# Patient Record
Sex: FEMALE | Race: BLACK OR AFRICAN AMERICAN | URBAN - METROPOLITAN AREA
[De-identification: names, ages, dates, MRNs, and addresses within clinical notes are randomized per-mention and may not be internally consistent; named-entity substitution may affect disease eponyms.]

---

## 2022-04-19 ENCOUNTER — OFFICE VISIT (OUTPATIENT)
Dept: URBAN - METROPOLITAN AREA CLINIC 27 | Facility: CLINIC | Age: 62
End: 2022-04-19

## 2022-04-25 ENCOUNTER — WEB ENCOUNTER (OUTPATIENT)
Dept: URBAN - METROPOLITAN AREA CLINIC 84 | Facility: CLINIC | Age: 62
End: 2022-04-25

## 2022-04-25 ENCOUNTER — OFFICE VISIT (OUTPATIENT)
Dept: URBAN - METROPOLITAN AREA CLINIC 84 | Facility: CLINIC | Age: 62
End: 2022-04-25
Payer: COMMERCIAL

## 2022-04-25 DIAGNOSIS — K62.5 RECTAL BLEEDING: ICD-10-CM

## 2022-04-25 PROCEDURE — 99204 OFFICE O/P NEW MOD 45 MIN: CPT | Performed by: INTERNAL MEDICINE

## 2022-04-25 RX ORDER — LUBIPROSTONE 24 UG/1
1 CAPSULE WITH FOOD AND WATER CAPSULE, GELATIN COATED ORAL TWICE A DAY
Qty: 60 CAPSULE | Refills: 4 | OUTPATIENT
Start: 2022-04-25 | End: 2022-09-22

## 2022-04-25 NOTE — PHYSICAL EXAM GASTROINTESTINAL
Self Exam: Abdomen soft, non-tender to palpatation, non-distended , Abdomen , soft, nontender, nondistended , no guarding or rigidity , no masses palpable , normal bowel sounds , Liver and Spleen , no hepatomegaly present , no hepatosplenomegaly , liver nontender , spleen not palpable , Rectal , normal sphincter tone , no internal hemorrhoids, rectal masses or bleeding present

## 2022-04-25 NOTE — HPI-TODAY'S VISIT:
63 yo pt presents with c/o painless rectal bleeding for 4 days last week during constipation episodes with straining. Patient had a normal colonoscopy 2019. She denies prior episodes in the past.

## 2022-04-30 ENCOUNTER — TELEPHONE ENCOUNTER (OUTPATIENT)
Dept: URBAN - METROPOLITAN AREA CLINIC 121 | Facility: CLINIC | Age: 62
End: 2022-04-30

## 2022-05-01 ENCOUNTER — TELEPHONE ENCOUNTER (OUTPATIENT)
Dept: URBAN - METROPOLITAN AREA CLINIC 121 | Facility: CLINIC | Age: 62
End: 2022-05-01

## 2022-05-01 RX ORDER — LEVOTHYROXINE SODIUM 88 MCG
TABLET ORAL
Status: ACTIVE | COMMUNITY
Start: 2015-06-04

## 2022-05-01 RX ORDER — AMLODIPINE AND BENAZEPRIL HYDROCHLORIDE 5; 20 MG/1; MG/1
CAPSULE ORAL
Status: ACTIVE | COMMUNITY
Start: 2015-06-04

## 2022-05-01 RX ORDER — ASPIRIN 81 MG/1
TABLET ORAL
Status: ACTIVE | COMMUNITY
Start: 2015-06-04

## 2022-05-01 RX ORDER — COLD-HOT PACK
EACH MISCELLANEOUS
Status: ACTIVE | COMMUNITY
Start: 2015-06-04

## 2022-05-01 RX ORDER — ATORVASTATIN CALCIUM 20 MG/1
TABLET, FILM COATED ORAL
Status: ACTIVE | COMMUNITY
Start: 2015-06-04

## 2022-06-06 ENCOUNTER — OFFICE VISIT (OUTPATIENT)
Dept: URBAN - METROPOLITAN AREA CLINIC 84 | Facility: CLINIC | Age: 62
End: 2022-06-06

## 2022-06-14 ENCOUNTER — OFFICE VISIT (OUTPATIENT)
Dept: URBAN - METROPOLITAN AREA CLINIC 84 | Facility: CLINIC | Age: 62
End: 2022-06-14

## 2022-06-14 RX ORDER — LUBIPROSTONE 24 UG/1
1 CAPSULE WITH FOOD AND WATER CAPSULE, GELATIN COATED ORAL TWICE A DAY
Qty: 60 CAPSULE | Refills: 4 | Status: ACTIVE | COMMUNITY
Start: 2022-04-25 | End: 2022-09-22

## 2022-06-14 RX ORDER — LEVOTHYROXINE SODIUM 88 MCG
TABLET ORAL
Status: ACTIVE | COMMUNITY
Start: 2015-06-04

## 2022-06-14 RX ORDER — ATORVASTATIN CALCIUM 20 MG/1
TABLET, FILM COATED ORAL
Status: ACTIVE | COMMUNITY
Start: 2015-06-04

## 2022-06-14 RX ORDER — COLD-HOT PACK
EACH MISCELLANEOUS
Status: ACTIVE | COMMUNITY
Start: 2015-06-04

## 2022-06-14 RX ORDER — ASPIRIN 81 MG/1
TABLET ORAL
Status: ACTIVE | COMMUNITY
Start: 2015-06-04

## 2022-06-14 RX ORDER — AMLODIPINE AND BENAZEPRIL HYDROCHLORIDE 5; 20 MG/1; MG/1
CAPSULE ORAL
Status: ACTIVE | COMMUNITY
Start: 2015-06-04

## 2022-07-29 ENCOUNTER — OFFICE VISIT (OUTPATIENT)
Dept: URBAN - METROPOLITAN AREA CLINIC 84 | Facility: CLINIC | Age: 62
End: 2022-07-29

## 2022-08-29 ENCOUNTER — OFFICE VISIT (OUTPATIENT)
Dept: URBAN - METROPOLITAN AREA CLINIC 84 | Facility: CLINIC | Age: 62
End: 2022-08-29
Payer: COMMERCIAL

## 2022-08-29 VITALS
HEART RATE: 67 BPM | BODY MASS INDEX: 35.62 KG/M2 | SYSTOLIC BLOOD PRESSURE: 112 MMHG | DIASTOLIC BLOOD PRESSURE: 75 MMHG | HEIGHT: 65 IN | WEIGHT: 213.8 LBS | TEMPERATURE: 98.1 F

## 2022-08-29 DIAGNOSIS — K59.00 CONSTIPATION, UNSPECIFIED CONSTIPATION TYPE: ICD-10-CM

## 2022-08-29 DIAGNOSIS — R14.0 BLOATING: ICD-10-CM

## 2022-08-29 DIAGNOSIS — K90.49 FOOD INTOLERANCE: ICD-10-CM

## 2022-08-29 PROBLEM — 14760008: Status: ACTIVE | Noted: 2022-08-29

## 2022-08-29 PROCEDURE — 99204 OFFICE O/P NEW MOD 45 MIN: CPT | Performed by: INTERNAL MEDICINE

## 2022-08-29 RX ORDER — LUBIPROSTONE 8 UG/1
1 CAPSULE WITH FOOD AND WATER CAPSULE, GELATIN COATED ORAL TWICE A DAY
Qty: 60 CAPSULE | Refills: 3 | OUTPATIENT
Start: 2022-08-29 | End: 2022-12-26

## 2022-08-29 RX ORDER — COLD-HOT PACK
EACH MISCELLANEOUS
Status: ACTIVE | COMMUNITY
Start: 2015-06-04

## 2022-08-29 RX ORDER — LEVOTHYROXINE SODIUM 88 MCG
TABLET ORAL
Status: ACTIVE | COMMUNITY
Start: 2015-06-04

## 2022-08-29 RX ORDER — ASPIRIN 81 MG/1
TABLET ORAL
Status: ACTIVE | COMMUNITY
Start: 2015-06-04

## 2022-08-29 RX ORDER — AMLODIPINE AND BENAZEPRIL HYDROCHLORIDE 5; 20 MG/1; MG/1
CAPSULE ORAL
Status: ACTIVE | COMMUNITY
Start: 2015-06-04

## 2022-08-29 RX ORDER — ATORVASTATIN CALCIUM 20 MG/1
TABLET, FILM COATED ORAL
Status: ACTIVE | COMMUNITY
Start: 2015-06-04

## 2022-08-29 RX ORDER — LUBIPROSTONE 24 UG/1
1 CAPSULE WITH FOOD AND WATER CAPSULE, GELATIN COATED ORAL TWICE A DAY
Qty: 60 CAPSULE | Refills: 4 | Status: ACTIVE | COMMUNITY
Start: 2022-04-25 | End: 2022-09-22

## 2022-08-29 NOTE — HPI-TODAY'S VISIT:
61 yo pt presents with c/o abdominal discomfort with burning and belching and worst with meals. Admits to diarrhea and hard stools. Sxs have been present for 4-6 weeks. Sxs her getting worst. Admits no change in weight. Sxs occur mainly during day.

## 2022-10-24 ENCOUNTER — WEB ENCOUNTER (OUTPATIENT)
Dept: URBAN - METROPOLITAN AREA CLINIC 84 | Facility: CLINIC | Age: 62
End: 2022-10-24

## 2022-10-24 ENCOUNTER — DASHBOARD ENCOUNTERS (OUTPATIENT)
Age: 62
End: 2022-10-24

## 2022-10-24 ENCOUNTER — OFFICE VISIT (OUTPATIENT)
Dept: URBAN - METROPOLITAN AREA CLINIC 84 | Facility: CLINIC | Age: 62
End: 2022-10-24
Payer: COMMERCIAL

## 2022-10-24 VITALS
TEMPERATURE: 97.4 F | BODY MASS INDEX: 35.59 KG/M2 | HEART RATE: 59 BPM | SYSTOLIC BLOOD PRESSURE: 122 MMHG | DIASTOLIC BLOOD PRESSURE: 78 MMHG | HEIGHT: 65 IN | WEIGHT: 213.6 LBS

## 2022-10-24 DIAGNOSIS — R14.0 BLOATING: ICD-10-CM

## 2022-10-24 DIAGNOSIS — K90.49 FOOD INTOLERANCE: ICD-10-CM

## 2022-10-24 PROBLEM — 235719002: Status: ACTIVE | Noted: 2022-08-29

## 2022-10-24 PROCEDURE — 99214 OFFICE O/P EST MOD 30 MIN: CPT | Performed by: INTERNAL MEDICINE

## 2022-10-24 RX ORDER — ASPIRIN 81 MG/1
TABLET ORAL
Status: ACTIVE | COMMUNITY
Start: 2015-06-04

## 2022-10-24 RX ORDER — COLD-HOT PACK
EACH MISCELLANEOUS
Status: ACTIVE | COMMUNITY
Start: 2015-06-04

## 2022-10-24 RX ORDER — LEVOTHYROXINE SODIUM 88 MCG
TABLET ORAL
Status: ACTIVE | COMMUNITY
Start: 2015-06-04

## 2022-10-24 RX ORDER — AMLODIPINE AND BENAZEPRIL HYDROCHLORIDE 5; 20 MG/1; MG/1
CAPSULE ORAL
Status: ACTIVE | COMMUNITY
Start: 2015-06-04

## 2022-10-24 RX ORDER — DICYCLOMINE HYDROCHLORIDE 20 MG/1
1 TABLET TABLET ORAL THREE TIMES A DAY
Qty: 90 TABLET | Refills: 3 | OUTPATIENT
Start: 2022-10-24 | End: 2023-02-20

## 2022-10-24 RX ORDER — ATORVASTATIN CALCIUM 20 MG/1
TABLET, FILM COATED ORAL
Status: ACTIVE | COMMUNITY
Start: 2015-06-04

## 2022-10-24 RX ORDER — LUBIPROSTONE 8 UG/1
1 CAPSULE WITH FOOD AND WATER CAPSULE, GELATIN COATED ORAL TWICE A DAY
Qty: 60 CAPSULE | Refills: 3 | Status: ON HOLD | COMMUNITY
Start: 2022-08-29 | End: 2022-12-26

## 2022-10-24 NOTE — HPI-TODAY'S VISIT:
63 yo pt presents for f/u of constipation which has responded to IB guard helps with bloating/urgency. Pt admits to not using the low fodmaps diet. Pt states that amitiza was not covered.